# Patient Record
Sex: MALE | Race: WHITE | Employment: OTHER | URBAN - METROPOLITAN AREA
[De-identification: names, ages, dates, MRNs, and addresses within clinical notes are randomized per-mention and may not be internally consistent; named-entity substitution may affect disease eponyms.]

---

## 2017-09-11 ENCOUNTER — OFFICE VISIT (OUTPATIENT)
Dept: FAMILY MEDICINE CLINIC | Age: 67
End: 2017-09-11
Payer: COMMERCIAL

## 2017-09-11 VITALS
DIASTOLIC BLOOD PRESSURE: 80 MMHG | SYSTOLIC BLOOD PRESSURE: 120 MMHG | OXYGEN SATURATION: 97 % | WEIGHT: 153 LBS | HEIGHT: 69 IN | BODY MASS INDEX: 22.66 KG/M2 | HEART RATE: 82 BPM | RESPIRATION RATE: 16 BRPM

## 2017-09-11 DIAGNOSIS — Z00.00 ROUTINE ADULT HEALTH MAINTENANCE: Primary | ICD-10-CM

## 2017-09-11 DIAGNOSIS — D36.9 ADENOMATOUS POLYPS: ICD-10-CM

## 2017-09-11 DIAGNOSIS — Z12.5 ENCOUNTER FOR SCREENING FOR MALIGNANT NEOPLASM OF PROSTATE: ICD-10-CM

## 2017-09-11 DIAGNOSIS — K64.9 HEMORRHOIDS, UNSPECIFIED HEMORRHOID TYPE: ICD-10-CM

## 2017-09-11 PROCEDURE — G0439 PPPS, SUBSEQ VISIT: HCPCS | Performed by: FAMILY MEDICINE

## 2017-09-11 ASSESSMENT — PATIENT HEALTH QUESTIONNAIRE - PHQ9
SUM OF ALL RESPONSES TO PHQ QUESTIONS 1-9: 0
1. LITTLE INTEREST OR PLEASURE IN DOING THINGS: 0
SUM OF ALL RESPONSES TO PHQ9 QUESTIONS 1 & 2: 0
2. FEELING DOWN, DEPRESSED OR HOPELESS: 0

## 2017-09-16 LAB
ALBUMIN SERPL-MCNC: 4.2 G/DL
ALP BLD-CCNC: 74 U/L
ALT SERPL-CCNC: 15 U/L
ANION GAP SERPL CALCULATED.3IONS-SCNC: 7 MMOL/L
AST SERPL-CCNC: 16 U/L
BASOPHILS ABSOLUTE: 0.1 /ΜL
BASOPHILS RELATIVE PERCENT: 1.2 %
BILIRUB SERPL-MCNC: 0.6 MG/DL (ref 0.1–1.4)
BUN BLDV-MCNC: 15 MG/DL
CALCIUM SERPL-MCNC: 9 MG/DL
CHLORIDE BLD-SCNC: 106 MMOL/L
CHOLESTEROL, TOTAL: 208 MG/DL
CHOLESTEROL/HDL RATIO: 5
CO2: 27 MMOL/L
CREAT SERPL-MCNC: 0.97 MG/DL
EOSINOPHILS ABSOLUTE: 0.2 /ΜL
EOSINOPHILS RELATIVE PERCENT: 2.9 %
GFR CALCULATED: >60
GLUCOSE BLD-MCNC: 89 MG/DL
HCT VFR BLD CALC: 47 % (ref 41–53)
HDLC SERPL-MCNC: 42 MG/DL (ref 35–70)
HEMOGLOBIN: 16.7 G/DL (ref 13.5–17.5)
LDL CHOLESTEROL CALCULATED: 149 MG/DL (ref 0–160)
LYMPHOCYTES ABSOLUTE: 1.9 /ΜL
LYMPHOCYTES RELATIVE PERCENT: 27.3 %
MCH RBC QN AUTO: 31.6 PG
MCHC RBC AUTO-ENTMCNC: 35.5 G/DL
MCV RBC AUTO: 89 FL
MONOCYTES ABSOLUTE: 0.7 /ΜL
MONOCYTES RELATIVE PERCENT: 9.8 %
NEUTROPHILS ABSOLUTE: 4.2 /ΜL
NEUTROPHILS RELATIVE PERCENT: 58.8 %
PDW BLD-RTO: 13.3 %
PLATELET # BLD: 221 K/ΜL
PMV BLD AUTO: 7.1 FL
POTASSIUM SERPL-SCNC: 3.8 MMOL/L
PROSTATE SPECIFIC ANTIGEN: 1.2 NG/ML
RBC # BLD: 5.27 10^6/ΜL
SODIUM BLD-SCNC: 140 MMOL/L
TOTAL PROTEIN: 6.8
TRIGL SERPL-MCNC: 87 MG/DL
VLDLC SERPL CALC-MCNC: 17 MG/DL
WBC # BLD: 7.1 10^3/ML

## 2017-09-18 DIAGNOSIS — Z00.00 ROUTINE ADULT HEALTH MAINTENANCE: ICD-10-CM

## 2017-09-18 DIAGNOSIS — Z12.5 ENCOUNTER FOR SCREENING FOR MALIGNANT NEOPLASM OF PROSTATE: ICD-10-CM

## 2017-09-20 DIAGNOSIS — E78.00 ELEVATED CHOLESTEROL: Primary | ICD-10-CM

## 2018-11-06 ENCOUNTER — OFFICE VISIT (OUTPATIENT)
Dept: FAMILY MEDICINE CLINIC | Age: 68
End: 2018-11-06
Payer: COMMERCIAL

## 2018-11-06 VITALS
SYSTOLIC BLOOD PRESSURE: 150 MMHG | DIASTOLIC BLOOD PRESSURE: 80 MMHG | BODY MASS INDEX: 21.42 KG/M2 | HEIGHT: 70 IN | WEIGHT: 149.6 LBS

## 2018-11-06 DIAGNOSIS — M54.42 CHRONIC LOW BACK PAIN WITH LEFT-SIDED SCIATICA, UNSPECIFIED BACK PAIN LATERALITY: Primary | ICD-10-CM

## 2018-11-06 DIAGNOSIS — G89.29 CHRONIC LOW BACK PAIN WITH LEFT-SIDED SCIATICA, UNSPECIFIED BACK PAIN LATERALITY: Primary | ICD-10-CM

## 2018-11-06 DIAGNOSIS — Z00.00 ROUTINE ADULT HEALTH MAINTENANCE: ICD-10-CM

## 2018-11-06 DIAGNOSIS — Z12.5 PROSTATE CANCER SCREENING: ICD-10-CM

## 2018-11-06 PROCEDURE — 99213 OFFICE O/P EST LOW 20 MIN: CPT | Performed by: FAMILY MEDICINE

## 2018-11-06 ASSESSMENT — PATIENT HEALTH QUESTIONNAIRE - PHQ9
SUM OF ALL RESPONSES TO PHQ QUESTIONS 1-9: 0
SUM OF ALL RESPONSES TO PHQ9 QUESTIONS 1 & 2: 0
SUM OF ALL RESPONSES TO PHQ QUESTIONS 1-9: 0
2. FEELING DOWN, DEPRESSED OR HOPELESS: 0
1. LITTLE INTEREST OR PLEASURE IN DOING THINGS: 0

## 2018-11-06 NOTE — PROGRESS NOTES
Visit Information    Have you changed or started any medications since your last visit including any over-the-counter medicines, vitamins, or herbal medicines? no   Are you having any side effects from any of your medications? -  no  Have you stopped taking any of your medications? Is so, why? -  no    Have you seen any other physician or provider since your last visit? No  Have you had any other diagnostic tests since your last visit? No  Have you been seen in the emergency room and/or had an admission to a hospital since we last saw you? No  Have you had your routine dental cleaning in the past 6 months? yes -     Have you activated your [employer] account? If not, what are your barriers?  No:      Patient Care Team:  Cuca Faye DO as PCP - General    Medical History Review  Past Medical, Family, and Social History reviewed and  contribute to the patient presenting condition    Health Maintenance   Topic Date Due    AAA screen  1950    Hepatitis C screen  1950    DTaP/Tdap/Td vaccine (1 - Tdap) 03/07/1969    Shingles Vaccine (1 of 2 - 2 Dose Series) 03/07/2000    Pneumococcal low/med risk (1 of 2 - PCV13) 03/07/2015    Flu vaccine (1) 09/01/2018    Lipid screen  09/16/2022    Colon cancer screen colonoscopy  11/13/2027

## 2018-11-26 LAB
ALBUMIN SERPL-MCNC: 4.2 G/DL
ALP BLD-CCNC: 99 U/L
ALT SERPL-CCNC: 14 U/L
ANION GAP SERPL CALCULATED.3IONS-SCNC: 6 MMOL/L
AST SERPL-CCNC: 15 U/L
BASOPHILS ABSOLUTE: 0.1 /ΜL
BASOPHILS RELATIVE PERCENT: 1.2 %
BILIRUB SERPL-MCNC: 0.6 MG/DL (ref 0.1–1.4)
BUN BLDV-MCNC: 13 MG/DL
CALCIUM SERPL-MCNC: 9.6 MG/DL
CHLORIDE BLD-SCNC: 105 MMOL/L
CHOLESTEROL, TOTAL: 248 MG/DL
CHOLESTEROL/HDL RATIO: 6.2
CO2: 30 MMOL/L
CREAT SERPL-MCNC: 1.06 MG/DL
EOSINOPHILS ABSOLUTE: 0.1 /ΜL
EOSINOPHILS RELATIVE PERCENT: 2 %
GFR CALCULATED: >60
GLUCOSE BLD-MCNC: 102 MG/DL
HCT VFR BLD CALC: 48.7 % (ref 41–53)
HDLC SERPL-MCNC: 40 MG/DL (ref 35–70)
HEMOGLOBIN: 16.8 G/DL (ref 13.5–17.5)
LDL CHOLESTEROL CALCULATED: 172 MG/DL (ref 0–160)
LYMPHOCYTES ABSOLUTE: 1.7 /ΜL
LYMPHOCYTES RELATIVE PERCENT: 28.1 %
MCH RBC QN AUTO: 30.3 PG
MCHC RBC AUTO-ENTMCNC: 34.6 G/DL
MCV RBC AUTO: 87 FL
MONOCYTES ABSOLUTE: 0.4 /ΜL
MONOCYTES RELATIVE PERCENT: 7.5 %
NEUTROPHILS ABSOLUTE: 3.7 /ΜL
NEUTROPHILS RELATIVE PERCENT: 61.2 %
PDW BLD-RTO: 13.9 %
PLATELET # BLD: 248 K/ΜL
PMV BLD AUTO: 7.4 FL
POTASSIUM SERPL-SCNC: 4.1 MMOL/L
PROSTATE SPECIFIC ANTIGEN: 0.55 NG/ML
RBC # BLD: 5.57 10^6/ΜL
SODIUM BLD-SCNC: 141 MMOL/L
TOTAL PROTEIN: 7
TRIGL SERPL-MCNC: 181 MG/DL
VLDLC SERPL CALC-MCNC: 36 MG/DL
WBC # BLD: 6 10^3/ML

## 2018-11-28 DIAGNOSIS — Z12.5 PROSTATE CANCER SCREENING: ICD-10-CM

## 2018-11-28 DIAGNOSIS — Z00.00 ROUTINE ADULT HEALTH MAINTENANCE: ICD-10-CM

## 2018-11-29 DIAGNOSIS — M54.16 LUMBAR BACK PAIN WITH RADICULOPATHY AFFECTING LOWER EXTREMITY: ICD-10-CM

## 2018-11-29 DIAGNOSIS — M51.36 BULGING OF LUMBAR INTERVERTEBRAL DISC: Primary | ICD-10-CM

## 2018-11-30 DIAGNOSIS — G89.29 CHRONIC LOW BACK PAIN WITH LEFT-SIDED SCIATICA, UNSPECIFIED BACK PAIN LATERALITY: ICD-10-CM

## 2018-11-30 DIAGNOSIS — M54.42 CHRONIC LOW BACK PAIN WITH LEFT-SIDED SCIATICA, UNSPECIFIED BACK PAIN LATERALITY: ICD-10-CM
